# Patient Record
Sex: FEMALE | Race: WHITE | Employment: FULL TIME | URBAN - METROPOLITAN AREA
[De-identification: names, ages, dates, MRNs, and addresses within clinical notes are randomized per-mention and may not be internally consistent; named-entity substitution may affect disease eponyms.]

---

## 2018-07-02 ENCOUNTER — LAB ENCOUNTER (OUTPATIENT)
Dept: LAB | Facility: HOSPITAL | Age: 43
End: 2018-07-02
Attending: FAMILY MEDICINE
Payer: COMMERCIAL

## 2018-07-02 ENCOUNTER — OFFICE VISIT (OUTPATIENT)
Dept: FAMILY MEDICINE CLINIC | Facility: CLINIC | Age: 43
End: 2018-07-02

## 2018-07-02 VITALS
OXYGEN SATURATION: 94 % | DIASTOLIC BLOOD PRESSURE: 80 MMHG | WEIGHT: 218 LBS | HEIGHT: 62 IN | SYSTOLIC BLOOD PRESSURE: 142 MMHG | HEART RATE: 90 BPM | BODY MASS INDEX: 40.12 KG/M2

## 2018-07-02 DIAGNOSIS — N20.0 KIDNEY STONES: ICD-10-CM

## 2018-07-02 DIAGNOSIS — K76.0 FATTY LIVER: ICD-10-CM

## 2018-07-02 DIAGNOSIS — D86.9 SARCOIDOSIS: ICD-10-CM

## 2018-07-02 DIAGNOSIS — E11.9 TYPE 2 DIABETES MELLITUS WITHOUT COMPLICATION, WITHOUT LONG-TERM CURRENT USE OF INSULIN (HCC): Primary | ICD-10-CM

## 2018-07-02 DIAGNOSIS — I10 HYPERTENSION, ESSENTIAL: ICD-10-CM

## 2018-07-02 DIAGNOSIS — Z01.89 ENCOUNTER FOR ROUTINE LABORATORY TESTING: ICD-10-CM

## 2018-07-02 DIAGNOSIS — D69.6 THROMBOCYTOPENIA (HCC): ICD-10-CM

## 2018-07-02 DIAGNOSIS — R00.0 TACHYCARDIA: ICD-10-CM

## 2018-07-02 DIAGNOSIS — K74.60 CIRRHOSIS OF LIVER WITHOUT ASCITES, UNSPECIFIED HEPATIC CIRRHOSIS TYPE (HCC): ICD-10-CM

## 2018-07-02 DIAGNOSIS — E11.9 TYPE 2 DIABETES MELLITUS WITHOUT COMPLICATION, WITHOUT LONG-TERM CURRENT USE OF INSULIN (HCC): ICD-10-CM

## 2018-07-02 LAB
BASOPHILS # BLD: 0 K/UL (ref 0–0.2)
BASOPHILS NFR BLD: 1 %
DHEA-S SERPL-MCNC: 38.5 UG/DL (ref 20–266)
EOSINOPHIL # BLD: 0.2 K/UL (ref 0–0.7)
EOSINOPHIL NFR BLD: 2 %
ERYTHROCYTE [DISTWIDTH] IN BLOOD BY AUTOMATED COUNT: 14 % (ref 11–15)
HCT VFR BLD AUTO: 39.7 % (ref 35–48)
HGB BLD-MCNC: 13.9 G/DL (ref 12–16)
LYMPHOCYTES # BLD: 2.2 K/UL (ref 1–4)
LYMPHOCYTES NFR BLD: 26 %
MAGNESIUM SERPL-MCNC: 1.7 MG/DL (ref 1.8–2.5)
MCH RBC QN AUTO: 31.5 PG (ref 27–32)
MCHC RBC AUTO-ENTMCNC: 34.9 G/DL (ref 32–37)
MCV RBC AUTO: 90.4 FL (ref 80–100)
MONOCYTES # BLD: 0.6 K/UL (ref 0–1)
MONOCYTES NFR BLD: 8 %
NEUTROPHILS # BLD AUTO: 5.4 K/UL (ref 1.8–7.7)
NEUTROPHILS NFR BLD: 64 %
PLATELET # BLD AUTO: 118 K/UL (ref 140–400)
PMV BLD AUTO: 9.5 FL (ref 7.4–10.3)
RBC # BLD AUTO: 4.4 M/UL (ref 3.7–5.4)
T3FREE SERPL-MCNC: 3.44 PG/ML (ref 2.53–4.29)
T4 FREE SERPL-MCNC: 0.84 NG/DL (ref 0.58–1.64)
TSH SERPL-ACNC: 1.21 UIU/ML (ref 0.45–5.33)
VIT B12 SERPL-MCNC: 492 PG/ML (ref 181–914)
WBC # BLD AUTO: 8.4 K/UL (ref 4–11)

## 2018-07-02 PROCEDURE — 84481 FREE ASSAY (FT-3): CPT

## 2018-07-02 PROCEDURE — 36415 COLL VENOUS BLD VENIPUNCTURE: CPT

## 2018-07-02 PROCEDURE — 82607 VITAMIN B-12: CPT

## 2018-07-02 PROCEDURE — 84207 ASSAY OF VITAMIN B-6: CPT

## 2018-07-02 PROCEDURE — 83036 HEMOGLOBIN GLYCOSYLATED A1C: CPT

## 2018-07-02 PROCEDURE — 99204 OFFICE O/P NEW MOD 45 MIN: CPT | Performed by: FAMILY MEDICINE

## 2018-07-02 PROCEDURE — 83735 ASSAY OF MAGNESIUM: CPT

## 2018-07-02 PROCEDURE — 86628 CANDIDA ANTIBODY: CPT

## 2018-07-02 PROCEDURE — 84443 ASSAY THYROID STIM HORMONE: CPT

## 2018-07-02 PROCEDURE — 84439 ASSAY OF FREE THYROXINE: CPT

## 2018-07-02 PROCEDURE — 82306 VITAMIN D 25 HYDROXY: CPT

## 2018-07-02 PROCEDURE — 82627 DEHYDROEPIANDROSTERONE: CPT | Performed by: FAMILY MEDICINE

## 2018-07-02 PROCEDURE — 82164 ANGIOTENSIN I ENZYME TEST: CPT

## 2018-07-02 PROCEDURE — 85025 COMPLETE CBC W/AUTO DIFF WBC: CPT

## 2018-07-02 NOTE — PATIENT INSTRUCTIONS
The products and items listed below (the “Products”)  and their claims have not been evaluated by the Food and Drug Administration. Dietary products are not intended to treat, prevent, mitigate or cure disease.  Ultimately, you must draw your own conclusion their body weight in water per day or at least 70-80 oz of water per day.   Emphasize In moderation Avoid   NON-STARCHY VEGETABLES  Artichokes  Asparagus  Broccoli  [place] sprouts  Cabbage  Celery  Cucumber  Eggplant  Garlic (raw)  [address] Paprika  Rosemary  Salt  Thyme  Turmeric  HEALTHY FATS & OILS  Coconut oil (virgin)  Flax oil  Olive oil  Sesame oil  NO-SUGAR SWEETENERS  Erythritol  Stevia  Xylitol  FERMENTED FOODS  Kefir  Olives  Sauerkraut  Yogurt  DRINKS  [place] coffee  Filtered lashaun

## 2018-07-02 NOTE — PROGRESS NOTES
Jacquie Pyle is a 40year old female. Patient presents with:  Establish Care      HPI:      is starting to eat 3 meals per day.   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 Pt already with names of specialists.    Counseled on an as octanoic acid. Caprylic Acid is a medium chain fatty acid (MCT) that is naturally found in coconut and palm kernel oil. Caprylic Acid may contribute to support a healthy digestive bacterial environment. *  Find at [employer] or [employer] or online coffee  Green tea  Vegetable juice  Vegetables HIGH SUGAR FRUITS  Bananas  Dates  Fruit juices  Grapes  [place]  Raisins  GLUTINOUS GRAINS  Barley  [place]  Spelt  Wheat  TOXIC MEATS & FISH  Pork  Processed meats  Shellfish  Swordfish  Tuna  SOME DAIRY PRODUCTS

## 2018-07-03 LAB
25(OH)D3 SERPL-MCNC: 41.6 NG/ML
HBA1C MFR BLD: 5.8 % (ref 4–6)

## 2018-07-04 LAB — ANGIOTENSIN CONVERTING ENZYME: 104 U/L

## 2018-07-06 LAB
CANDIDA ANTIBODY IGA: 2.45 EV
CANDIDA ANTIBODY IGG: 1.31 EV
CANDIDA ANTIBODY IGM: 1.34 EV
VITAMIN B6: 24.6 NMOL/L

## 2018-08-27 ENCOUNTER — APPOINTMENT (OUTPATIENT)
Dept: LAB | Facility: HOSPITAL | Age: 43
End: 2018-08-27
Attending: FAMILY MEDICINE
Payer: COMMERCIAL

## 2018-08-27 ENCOUNTER — OFFICE VISIT (OUTPATIENT)
Dept: FAMILY MEDICINE CLINIC | Facility: CLINIC | Age: 43
End: 2018-08-27
Payer: COMMERCIAL

## 2018-08-27 VITALS
SYSTOLIC BLOOD PRESSURE: 110 MMHG | DIASTOLIC BLOOD PRESSURE: 80 MMHG | BODY MASS INDEX: 38 KG/M2 | OXYGEN SATURATION: 98 % | HEART RATE: 70 BPM | WEIGHT: 206 LBS

## 2018-08-27 DIAGNOSIS — I10 HYPERTENSION, ESSENTIAL: ICD-10-CM

## 2018-08-27 DIAGNOSIS — M79.7 FIBROMYALGIA: ICD-10-CM

## 2018-08-27 DIAGNOSIS — K74.60 CIRRHOSIS OF LIVER WITHOUT ASCITES, UNSPECIFIED HEPATIC CIRRHOSIS TYPE (HCC): ICD-10-CM

## 2018-08-27 DIAGNOSIS — E11.9 TYPE 2 DIABETES MELLITUS WITHOUT COMPLICATION, WITHOUT LONG-TERM CURRENT USE OF INSULIN (HCC): ICD-10-CM

## 2018-08-27 DIAGNOSIS — E66.9 OBESITY (BMI 30-39.9): ICD-10-CM

## 2018-08-27 DIAGNOSIS — D86.9 SARCOIDOSIS: ICD-10-CM

## 2018-08-27 DIAGNOSIS — K76.0 FATTY LIVER: ICD-10-CM

## 2018-08-27 DIAGNOSIS — E11.9 TYPE 2 DIABETES MELLITUS WITHOUT COMPLICATION, WITHOUT LONG-TERM CURRENT USE OF INSULIN (HCC): Primary | ICD-10-CM

## 2018-08-27 PROBLEM — Z86.2 HISTORY OF ITP: Status: ACTIVE | Noted: 2018-06-25

## 2018-08-27 LAB — CRP SERPL HS-MCNC: 6.7 MG/L (ref 0–7.5)

## 2018-08-27 PROCEDURE — 86141 C-REACTIVE PROTEIN HS: CPT

## 2018-08-27 PROCEDURE — 36415 COLL VENOUS BLD VENIPUNCTURE: CPT

## 2018-08-27 PROCEDURE — 99214 OFFICE O/P EST MOD 30 MIN: CPT | Performed by: FAMILY MEDICINE

## 2018-08-27 RX ORDER — MILK THISTLE 150 MG
CAPSULE ORAL
COMMUNITY
Start: 2017-01-14

## 2018-08-27 NOTE — PROGRESS NOTES
Macy Lu is a 40year old female. No chief complaint on file.       HPI:     Has lost 10 lbs since the last visit  Limiting carbs, getting more protein throughout the day  Having more pain issues    Is applying for medical marijuana    Taking ibu Smoker     Quit date: 2007    Smokeless tobacco: Never Used    Alcohol use No    Drug use: No    Sexual activity: Not on file     Other Topics Concern    Caffeine Concern No    Exercise Yes    Seat Belt Yes    Special Diet Yes    Stress Concern Yes    Pattie Pena closely  Advised to limit NSAID intake daily    Given further recommendations as below    Orders Placed This Visit:    Orders Placed This Encounter      C-RP/High Sensitivity [E]    Patient Instructions   The products and items listed below (the “Products”

## 2018-10-22 ENCOUNTER — OFFICE VISIT (OUTPATIENT)
Dept: FAMILY MEDICINE CLINIC | Facility: CLINIC | Age: 43
End: 2018-10-22
Payer: COMMERCIAL

## 2018-10-22 ENCOUNTER — APPOINTMENT (OUTPATIENT)
Dept: LAB | Facility: HOSPITAL | Age: 43
End: 2018-10-22
Attending: FAMILY MEDICINE
Payer: COMMERCIAL

## 2018-10-22 VITALS
WEIGHT: 214 LBS | HEIGHT: 62 IN | SYSTOLIC BLOOD PRESSURE: 126 MMHG | DIASTOLIC BLOOD PRESSURE: 72 MMHG | BODY MASS INDEX: 39.38 KG/M2 | OXYGEN SATURATION: 90 % | HEART RATE: 82 BPM

## 2018-10-22 DIAGNOSIS — R00.1 BRADYCARDIA: ICD-10-CM

## 2018-10-22 DIAGNOSIS — D86.9 SARCOIDOSIS: ICD-10-CM

## 2018-10-22 DIAGNOSIS — E11.9 TYPE 2 DIABETES MELLITUS WITHOUT COMPLICATION, WITHOUT LONG-TERM CURRENT USE OF INSULIN (HCC): Primary | ICD-10-CM

## 2018-10-22 DIAGNOSIS — I10 HYPERTENSION, ESSENTIAL: ICD-10-CM

## 2018-10-22 DIAGNOSIS — M79.7 FIBROMYALGIA: ICD-10-CM

## 2018-10-22 DIAGNOSIS — K21.9 CHRONIC GERD: ICD-10-CM

## 2018-10-22 DIAGNOSIS — K44.9 HIATAL HERNIA: ICD-10-CM

## 2018-10-22 DIAGNOSIS — E11.9 TYPE 2 DIABETES MELLITUS WITHOUT COMPLICATION, WITHOUT LONG-TERM CURRENT USE OF INSULIN (HCC): ICD-10-CM

## 2018-10-22 PROCEDURE — 36415 COLL VENOUS BLD VENIPUNCTURE: CPT

## 2018-10-22 PROCEDURE — 99214 OFFICE O/P EST MOD 30 MIN: CPT | Performed by: FAMILY MEDICINE

## 2018-10-22 PROCEDURE — 83036 HEMOGLOBIN GLYCOSYLATED A1C: CPT

## 2018-10-22 NOTE — PROGRESS NOTES
Cristobal Gurrola is a 40year old female. Patient presents with: Follow - Up: FMLA       HPI:     Fibromyalgia continues to be an issue. Due to her fibromyalgia she will have to take time off. Has FMLA paperwork that she is requesting get completed. SOCIAL HISTORY:   Social History    Socioeconomic History      Marital status:       Spouse name: Not on file      Number of children: Not on file      Years of education: Not on file      Highest education level: Not on file    Social Needs cranial nerve deficit. Gait normal.   Skin: Skin is warm and dry. Psychiatric: Affect normal.       ASSESSMENT AND PLAN:       1.  Type 2 diabetes mellitus without complication, without long-term current use of insulin (HCC)  - HEMOGLOBIN A1C; Future    2 Patient agrees to contact his/her healthcare professional and stop use of Products should any reactions arise.      Recommendations:    Low Sugar, Low Carb LIFESTYLE Table  Goal: 5 servings of veggies per day, 1-2 servings of fruit per day, limit caffeine ( vinegar  REFINED/PROCESSED FATS & OILS  Canola oil  Fake ‘butter’ spreads  Margarine  Soybean oil  Sunflower oil  SUGARS & SUGAR SUBSTITUTES  Agave  Aspartame  Cane sugar  Corn syrup  Honey  Maple syrup  Molasses  Sugar  CAFFEINATED OR SUGARY  DRINKS  Srikanth Directions: 1 tablet twice daily       Where: [facility] [employer] or [employer]        Return in about 3 months (around 1/22/2019) for Integrative Medicine - Established (30 min). Patient affirmed understanding of plan and all questions were answered.

## 2018-10-22 NOTE — PATIENT INSTRUCTIONS
The products and items listed below (the “Products”)  and their claims have not been evaluated by the Food and Drug Administration. Dietary products are not intended to treat, prevent, mitigate or cure disease.  Ultimately, you must draw your own conclusion (probiotic)  LOW-MOLD NUTS & SEEDS  Almonds  Coconut  Flax seed  Hazelnuts  Sunflower seeds  HERBS, SPICES, & CONDIMENTS  Apple cider vinegar  Basil  Black pepper  Cinnamon  Cloves  Coconut aminos  Dill  Garlic VEGETABLES  Beans  Beets  Carrots  Corn (non- nuts and pumpkin seeds)  • Nut butter packets (almond, pecan, macadamia nuts—[employer] makes individual packs)  • Coconut butter packets ([employer]ana brand is great)   • Whole food or raw food protein bars (Raw Revolution and LÄRABAR)  • Roasted Seaweed  • [employer]

## 2018-10-26 ENCOUNTER — TELEPHONE (OUTPATIENT)
Dept: FAMILY MEDICINE CLINIC | Facility: CLINIC | Age: 43
End: 2018-10-26

## 2018-10-27 PROBLEM — K74.60 LIVER CIRRHOSIS (HCC): Status: ACTIVE | Noted: 2018-07-02

## 2018-10-27 PROBLEM — K75.81 NASH (NONALCOHOLIC STEATOHEPATITIS): Status: ACTIVE | Noted: 2018-06-24

## 2018-10-27 PROBLEM — E55.9 VITAMIN D DEFICIENCY: Status: ACTIVE | Noted: 2017-06-12

## 2018-10-27 PROBLEM — E50.9 VITAMIN A DEFICIENCY: Status: ACTIVE | Noted: 2017-06-12

## 2018-11-27 ENCOUNTER — TELEPHONE (OUTPATIENT)
Dept: CARDIOLOGY CLINIC | Facility: CLINIC | Age: 43
End: 2018-11-27

## 2018-11-27 NOTE — TELEPHONE ENCOUNTER
CHRISTINAI - Pt called to let Kamran Yemi know that she will be having monitor placed by another cardiologist at [facility] so she will not need to schedule anything.

## 2020-04-22 ENCOUNTER — TELEPHONE (OUTPATIENT)
Dept: FAMILY MEDICINE CLINIC | Facility: CLINIC | Age: 45
End: 2020-04-22

## 2024-11-06 DIAGNOSIS — R20.0 NUMBNESS AND TINGLING OF BOTH UPPER EXTREMITIES: Primary | ICD-10-CM

## 2024-11-06 DIAGNOSIS — R20.2 NUMBNESS AND TINGLING OF BOTH UPPER EXTREMITIES: Primary | ICD-10-CM
